# Patient Record
Sex: FEMALE | Race: WHITE
[De-identification: names, ages, dates, MRNs, and addresses within clinical notes are randomized per-mention and may not be internally consistent; named-entity substitution may affect disease eponyms.]

---

## 2017-07-28 ENCOUNTER — HOSPITAL ENCOUNTER (OUTPATIENT)
Dept: HOSPITAL 47 - EC | Age: 52
Setting detail: OBSERVATION
Discharge: LEFT BEFORE DISCHARGE | End: 2017-07-28
Payer: COMMERCIAL

## 2017-07-28 VITALS — SYSTOLIC BLOOD PRESSURE: 129 MMHG | HEART RATE: 91 BPM | TEMPERATURE: 99.2 F | DIASTOLIC BLOOD PRESSURE: 74 MMHG

## 2017-07-28 VITALS — RESPIRATION RATE: 16 BRPM

## 2017-07-28 VITALS — BODY MASS INDEX: 31.8 KG/M2

## 2017-07-28 DIAGNOSIS — N83.201: ICD-10-CM

## 2017-07-28 DIAGNOSIS — F90.9: ICD-10-CM

## 2017-07-28 DIAGNOSIS — Z79.899: ICD-10-CM

## 2017-07-28 DIAGNOSIS — K56.7: Primary | ICD-10-CM

## 2017-07-28 DIAGNOSIS — N83.202: ICD-10-CM

## 2017-07-28 DIAGNOSIS — F17.200: ICD-10-CM

## 2017-07-28 DIAGNOSIS — Z87.442: ICD-10-CM

## 2017-07-28 DIAGNOSIS — Z88.5: ICD-10-CM

## 2017-07-28 DIAGNOSIS — K21.9: ICD-10-CM

## 2017-07-28 LAB
ALP SERPL-CCNC: 51 U/L (ref 38–126)
ALT SERPL-CCNC: 29 U/L (ref 9–52)
AMYLASE SERPL-CCNC: 40 U/L (ref 30–110)
ANION GAP SERPL CALC-SCNC: 9 MMOL/L
AST SERPL-CCNC: 15 U/L (ref 14–36)
BASOPHILS # BLD AUTO: 0.1 K/UL (ref 0–0.2)
BASOPHILS NFR BLD AUTO: 1 %
BUN SERPL-SCNC: 14 MG/DL (ref 7–17)
CALCIUM SPEC-MCNC: 9.5 MG/DL (ref 8.4–10.2)
CH: 31.9
CHCM: 34.3
CHLORIDE SERPL-SCNC: 103 MMOL/L (ref 98–107)
CO2 SERPL-SCNC: 26 MMOL/L (ref 22–30)
EOSINOPHIL # BLD AUTO: 0.1 K/UL (ref 0–0.7)
EOSINOPHIL NFR BLD AUTO: 1 %
ERYTHROCYTE [DISTWIDTH] IN BLOOD BY AUTOMATED COUNT: 4.78 M/UL (ref 3.8–5.4)
ERYTHROCYTE [DISTWIDTH] IN BLOOD: 13.8 % (ref 11.5–15.5)
GLUCOSE SERPL-MCNC: 89 MG/DL (ref 74–99)
HCT VFR BLD AUTO: 44.6 % (ref 34–46)
HDW: 2.16
HGB BLD-MCNC: 15 GM/DL (ref 11.4–16)
LUC NFR BLD AUTO: 2 %
LYMPHOCYTES # SPEC AUTO: 1.4 K/UL (ref 1–4.8)
LYMPHOCYTES NFR SPEC AUTO: 13 %
MCH RBC QN AUTO: 31.5 PG (ref 25–35)
MCHC RBC AUTO-ENTMCNC: 33.7 G/DL (ref 31–37)
MCV RBC AUTO: 93.5 FL (ref 80–100)
MONOCYTES # BLD AUTO: 0.7 K/UL (ref 0–1)
MONOCYTES NFR BLD AUTO: 7 %
NEUTROPHILS # BLD AUTO: 8.5 K/UL (ref 1.3–7.7)
NEUTROPHILS NFR BLD AUTO: 77 %
NON-AFRICAN AMERICAN GFR(MDRD): >60
PH UR: 8 [PH] (ref 5–8)
POTASSIUM SERPL-SCNC: 3.9 MMOL/L (ref 3.5–5.1)
PROT SERPL-MCNC: 6.6 G/DL (ref 6.3–8.2)
SODIUM SERPL-SCNC: 138 MMOL/L (ref 137–145)
SP GR UR: 1.01 (ref 1–1.03)
UA BILLING (MACRO VS. MICRO): (no result)
UROBILINOGEN UR QL STRIP: <2 MG/DL (ref ?–2)
WBC # BLD AUTO: 0.25 10*3/UL
WBC # BLD AUTO: 11 K/UL (ref 3.8–10.6)
WBC (PEROX): 10.46

## 2017-07-28 PROCEDURE — 96375 TX/PRO/DX INJ NEW DRUG ADDON: CPT

## 2017-07-28 PROCEDURE — 96361 HYDRATE IV INFUSION ADD-ON: CPT

## 2017-07-28 PROCEDURE — 36415 COLL VENOUS BLD VENIPUNCTURE: CPT

## 2017-07-28 PROCEDURE — 96374 THER/PROPH/DIAG INJ IV PUSH: CPT

## 2017-07-28 PROCEDURE — 80053 COMPREHEN METABOLIC PANEL: CPT

## 2017-07-28 PROCEDURE — 83690 ASSAY OF LIPASE: CPT

## 2017-07-28 PROCEDURE — 96372 THER/PROPH/DIAG INJ SC/IM: CPT

## 2017-07-28 PROCEDURE — 99285 EMERGENCY DEPT VISIT HI MDM: CPT

## 2017-07-28 PROCEDURE — 81003 URINALYSIS AUTO W/O SCOPE: CPT

## 2017-07-28 PROCEDURE — 74177 CT ABD & PELVIS W/CONTRAST: CPT

## 2017-07-28 PROCEDURE — 85025 COMPLETE CBC W/AUTO DIFF WBC: CPT

## 2017-07-28 PROCEDURE — 96376 TX/PRO/DX INJ SAME DRUG ADON: CPT

## 2017-07-28 PROCEDURE — 82150 ASSAY OF AMYLASE: CPT

## 2017-07-28 RX ADMIN — MORPHINE SULFATE PRN MG: 4 INJECTION, SOLUTION INTRAMUSCULAR; INTRAVENOUS at 15:49

## 2017-07-28 RX ADMIN — MORPHINE SULFATE PRN MG: 4 INJECTION, SOLUTION INTRAMUSCULAR; INTRAVENOUS at 11:57

## 2017-07-28 NOTE — P.GSCN
History of Present Illness


Consult date: 07/28/17


Reason for Consult: 





Abdominal pain


History of present illness: 





The patient presents with a couple of day history of abdominal discomfort.  The 

pain is generalized.  It got much worse last night and began to radiate to the 

pelvis so she came in.  She was concerned she could have her recurrent kidney 

stone.  The computed tomography scan did not show that.  She has not had any 

problems with diarrhea or constipation.  She tends to have loose stools 1-2 

times a day but that's been present for many years.  Bowel movement was 2 days 

ago.  No blood in the stools or dark tarry stools.  She's had some nausea.  No 

fevers or chills.  No one else is been ill.





Review of Systems


All systems: negative





Past Medical History


Past Medical History: GERD/Reflux, Renal Disease


Additional Past Medical History / Comment(s): kidney stones, sinus infections, 

arthritis


History of Any Multi-Drug Resistant Organisms: None Reported


Past Surgical History: Breast Surgery, Tubal Ligation


Additional Past Surgical History / Comment(s): TL using clamps-at age 20 yrs 

over in [place], left breast lumpectomy (biopsy negative), exploratory lap of 

abdomen.


Past Anesthesia/Blood Transfusion Reactions: No Reported Reaction


Additional Past Anesthesia/Blood Transfusion Reaction / Comm: Pt states she has 

never recieve blood.


Past Psychological History: ADD/ADHD, Anxiety


Smoking Status: Current every day smoker


Past Alcohol Use History: Rare


Additional Past Alcohol Use History / Comment(s): Pt states she started smoking 

at age 16 yrs and is 1/2 ppd smoker.


Past Drug Use History: None Reported





- Past Family History


  ** Father


Family Medical History: Diabetes Mellitus


Additional Family Medical History / Comment(s): Father is 75 yrs old.





  ** Mother


History Unknown: Yes


Family Medical History: Unable to Obtain


Additional Family Medical History / Comment(s): Pt states mother is on alot of 

medications but she does not know what health issues she has.  Her mother is 75 

yrs. old.





Medications and Allergies


 Home Medications











 Medication  Instructions  Recorded  Confirmed  Type


 


Dextroamphetamine/Amphetamine 20 mg PO BID 10/30/15 07/28/17 History





[Adderall]    


 


LORazepam [Ativan] 0.5 mg PO BID PRN 10/30/15 07/28/17 History


 


Aspirin-Acet-Caff [id number] 2 tab PO DAILY PRN 07/28/17 07/28/17 History





[Excedrin]    


 


Fluticasone Nasal Spray [Flonase 2 spr EA NOSTRIL HS 07/28/17 07/28/17 History





Nasal Spray]    


 


Omeprazole [PriLOSEC] 20 mg PO DAILY 07/28/17 07/28/17 History











 Allergies











Allergy/AdvReac Type Severity Reaction Status Date / Time


 


hydrocodone bitartrate AdvReac  Nausea & Verified 07/28/17 08:13





[From Lortab]   Vomiting  














Surgical - Exam


Osteopathic Statement: *.  No significant issues noted on an osteopathic 

structural exam other than those noted in the History and Physical/Consult.


 Vital Signs











Temp Pulse Resp BP Pulse Ox


 


 97.8 F   101 H  18   134/85   96 


 


 07/28/17 05:14  07/28/17 05:14  07/28/17 05:14  07/28/17 05:14  07/28/17 05:14














- General


well developed, well nourished, no distress





- Eyes


normal ocular movement





- ENT


normal mucosa, no hearing loss





- Neck


trachea midline





- Respiratory


normal respiratory effort, clear to auscultation





- Cardiovascular


Rhythm: regular


Abnormal Heart Sounds: no systolic murmur





- Abdomen





No tympany to percussion


Abdomen: soft, tender (Mild diffuse), bowel sounds, no guarding, no rigid, no 

rebound, no distended





- Psychiatric


oriented to time, oriented to person, oriented to place, speech is normal, 

memory intact





Results





- Labs





 07/28/17 05:44





 07/28/17 05:44


 Abnormal Lab Results - Last 24 Hours (Table)











  07/28/17 Range/Units





  05:44 


 


WBC  11.0 H  (3.8-10.6)  k/uL


 


Neutrophils #  8.5 H  (1.3-7.7)  k/uL








 Diabetes panel











  07/28/17 Range/Units





  05:44 


 


Sodium  138  (137-145)  mmol/L


 


Potassium  3.9  (3.5-5.1)  mmol/L


 


Chloride  103  ()  mmol/L


 


Carbon Dioxide  26  (22-30)  mmol/L


 


BUN  14  (7-17)  mg/dL


 


Creatinine  0.70  (0.52-1.04)  mg/dL


 


Glucose  89  (74-99)  mg/dL


 


Calcium  9.5  (8.4-10.2)  mg/dL


 


AST  15  (14-36)  U/L


 


ALT  29  (9-52)  U/L


 


Alkaline Phosphatase  51  ()  U/L


 


Total Protein  6.6  (6.3-8.2)  g/dL


 


Albumin  4.2  (3.5-5.0)  g/dL








 Calcium panel











  07/28/17 Range/Units





  05:44 


 


Calcium  9.5  (8.4-10.2)  mg/dL


 


Albumin  4.2  (3.5-5.0)  g/dL








 Pituitary panel











  07/28/17 Range/Units





  05:44 


 


Sodium  138  (137-145)  mmol/L


 


Potassium  3.9  (3.5-5.1)  mmol/L


 


Chloride  103  ()  mmol/L


 


Carbon Dioxide  26  (22-30)  mmol/L


 


BUN  14  (7-17)  mg/dL


 


Creatinine  0.70  (0.52-1.04)  mg/dL


 


Glucose  89  (74-99)  mg/dL


 


Calcium  9.5  (8.4-10.2)  mg/dL








 Adrenal panel











  07/28/17 Range/Units





  05:44 


 


Sodium  138  (137-145)  mmol/L


 


Potassium  3.9  (3.5-5.1)  mmol/L


 


Chloride  103  ()  mmol/L


 


Carbon Dioxide  26  (22-30)  mmol/L


 


BUN  14  (7-17)  mg/dL


 


Creatinine  0.70  (0.52-1.04)  mg/dL


 


Glucose  89  (74-99)  mg/dL


 


Calcium  9.5  (8.4-10.2)  mg/dL


 


Total Bilirubin  0.5  (0.2-1.3)  mg/dL


 


AST  15  (14-36)  U/L


 


ALT  29  (9-52)  U/L


 


Alkaline Phosphatase  51  ()  U/L


 


Total Protein  6.6  (6.3-8.2)  g/dL


 


Albumin  4.2  (3.5-5.0)  g/dL














- Imaging


CT scan - abdomen: report reviewed, image reviewed





Assessment and Plan


(1) History of ovarian cyst


Status: Acute   





(2) History of nephrolithiasis


Status: Acute   





(3) Abdominal pain


Status: Acute   





(4) Ileus


Status: Acute   


Plan: 





Currently this appears to be a mild ileus.  She's tolerating a clear liquid 

diet.  I would treat her supportively.  Serial exams.  Currently nonsurgical.

## 2017-07-28 NOTE — ED
General Adult HPI





- General


Source: patient, RN notes reviewed


Mode of arrival: ambulatory


Limitations: no limitations





<Mateo Palomino - Last Filed: 07/28/17 05:27>





<Trenton Mcarthur - Last Filed: 07/28/17 08:09>





- General


Chief complaint: Abdominal Pain


Stated complaint: Abd pain


Time Seen by Provider: 07/28/17 05:23





- History of Present Illness


Initial comments: 





Patient is a pleasant 51-year-old female presenting to the emergency department 

complaining of abdominal discomfort.  Onset was 2 days ago.  Symptoms have been 

somewhat waxing and waning.  Discomfort is mostly diffuse however may be more 

so in the suprapubic region.  No back pain.  Patient has nausea without 

vomiting.  Patient did have some urinary frequency last week however that has 

improved.  No dysuria.  No hematuria.  No constipation or diarrhea.  No fevers. 

(Mateo Palomino)





- Related Data


 Home Medications











 Medication  Instructions  Recorded  Confirmed


 


Artificial Tears-Hypromellose 1 drops BOTH EYES BID PRN 10/30/15 02/15/16





[Artificial Tear Drops]   


 


Dextroamphetamine/Amphetamine 20 mg PO BID 10/30/15 02/15/16





[Adderall]   


 


Docusate [Colace] 100 mg PO DAILY PRN 10/30/15 02/15/16


 


LORazepam [Ativan] 0.5 mg PO BID PRN 10/30/15 02/15/16


 


Ranitidine HCl 75 mg PO BID PRN 10/30/15 02/15/16








 Previous Rx's











 Medication  Instructions  Recorded


 


HYDROcodone/APAP 10-325MG [Norco 1 tab PO Q6H PRN #15 tab 02/01/16





]  


 


Ibuprofen [Motrin] 600 mg PO Q8HR PRN #20 tab 02/01/16


 


Ondansetron Odt [Zofran Odt] 4 mg PO Q8HR PRN #10 tab 02/01/16


 


Tamsulosin [Flomax] 0.4 mg PO DAILY #7 cap 02/01/16


 


Erythromycin Ophth Oint [Romycin 1 applic RIGHT EYE QID #3.5 gm 02/15/16





Ophth Oint]  











 Allergies











Allergy/AdvReac Type Severity Reaction Status Date / Time


 


acetaminophen [From Lortab] AdvReac  Unknown Verified 07/28/17 05:16


 


hydrocodone bitartrate AdvReac  Unknown Verified 07/28/17 05:16





[From Lortab]     














Review of Systems


ROS Other: All systems not noted in ROS Statement are negative.


Constitutional: Denies: fever, chills


Eyes: Denies: eye pain


ENT: Denies: ear pain


Respiratory: Denies: cough


Cardiovascular: Denies: chest pain


Endocrine: Denies: fatigue


Gastrointestinal: Reports: abdominal pain, nausea.  Denies: vomiting, diarrhea, 

constipation


Genitourinary: Reports: frequency.  Denies: urgency, dysuria, hematuria


Musculoskeletal: Denies: back pain


Skin: Denies: rash


Neurological: Denies: weakness





<Mateo Palomino - Last Filed: 07/28/17 05:27>


ROS Other: All systems not noted in ROS Statement are negative.





<Trenton Mcarthur - Last Filed: 07/28/17 08:09>


ROS Statement: 


Those systems with pertinent positive or pertinent negative responses have been 

documented in the HPI.








Past Medical History


Past Medical History: GERD/Reflux, Renal Disease


Additional Past Medical History / Comment(s): 10/30/15  Pt presented to [facility] ER 

with suprapubic abdominal pain radiating to her back.  Pt has hx of kidney 

stone once before.  She stated it feels like that but then it did not radiate.  

Current pain began 3 weeks ago.  Obstetric history: She had her first vaginal 

delivery when she was 16, then she had a voluntary termination, then she had 

any other vaginal delivery.


History of Any Multi-Drug Resistant Organisms: None Reported


Past Surgical History: Tubal Ligation


Additional Past Surgical History / Comment(s): novasure uterine ablation, TL 

using clamps-at age 20 yrs over in [place].


Past Anesthesia/Blood Transfusion Reactions: No Reported Reaction


Additional Past Anesthesia/Blood Transfusion Reaction / Comment(s): Pt states 

she has never recieve blood.


Past Psychological History: ADD/ADHD


Smoking Status: Current every day smoker


Past Alcohol Use History: Rare


Past Drug Use History: None Reported





- Past Family History


  ** Father


Family Medical History: Diabetes Mellitus


Additional Family Medical History / Comment(s): Father is 75 yrs old.





  ** Mother


History Unknown: Yes


Additional Family Medical History / Comment(s): Pt states mother is on alot of 

medications but she does not know what health issues she has.  Her mother is 75 

yrs. old.





<Mateo Palomino - Last Filed: 07/28/17 05:27>





General Exam


Limitations: no limitations


General appearance: alert, in no apparent distress


Head exam: Present: atraumatic


Eye exam: Present: normal appearance, PERRL


ENT exam: Present: normal oropharynx


Neck exam: Present: normal inspection


Respiratory exam: Present: normal lung sounds bilaterally


Cardiovascular Exam: Present: regular rate, normal rhythm


  ** Expanded


Peripheral pulses: 2+: Posterior Tibialis (R), Posterior Tibialis (L)


GI/Abdominal exam: Present: soft, tenderness (Mild suprapubic tenderness), 

normal bowel sounds.  Absent: distended, guarding, rebound, rigid, pulsatile 

mass


Extremities exam: Present: normal inspection.  Absent: pedal edema, calf 

tenderness


Back exam: Present: normal inspection.  Absent: tenderness


Neurological exam: Present: alert


Psychiatric exam: Present: normal affect, normal mood


Skin exam: Present: normal color





<Mateo Palomino Last Filed: 07/28/17 05:27>


General appearance: alert, in no apparent distress


Head exam: Present: atraumatic, normocephalic, normal inspection


Eye exam: Present: normal appearance, PERRL, EOMI.  Absent: scleral icterus, 

conjunctival injection, periorbital swelling


ENT exam: Present: normal exam, mucous membranes moist


Neck exam: Present: normal inspection.  Absent: tenderness, meningismus, 

lymphadenopathy


Respiratory exam: Present: normal lung sounds bilaterally.  Absent: respiratory 

distress, wheezes, rales, rhonchi, stridor


Cardiovascular Exam: Present: regular rate, normal rhythm, normal heart sounds.

  Absent: systolic murmur, diastolic murmur, rubs, gallop, clicks


GI/Abdominal exam: Present: soft, normal bowel sounds.  Absent: distended, 

tenderness, guarding, rebound, rigid


Extremities exam: Present: normal inspection, full ROM, normal capillary 

refill.  Absent: tenderness, pedal edema, joint swelling, calf tenderness


Back exam: Present: normal inspection


Neurological exam: Present: alert, oriented X3, CN II-XII intact


Psychiatric exam: Present: normal affect, normal mood


Skin exam: Present: warm, dry, intact, normal color.  Absent: rash





<Trenton Mcarthur - Last Filed: 07/28/17 08:09>





Course





<Mateo Palomino Last Filed: 07/28/17 05:27>





<Trenton Mcarthur - Last Filed: 07/28/17 08:09>





 Vital Signs











  07/28/17 07/28/17





  05:14 07:17


 


Temperature 97.8 F 97.1 F L


 


Pulse Rate 101 H 90


 


Respiratory 18 17





Rate  


 


Blood Pressure 134/85 157/74


 


O2 Sat by Pulse 96 100





Oximetry  














- Reevaluation(s)


Reevaluation #1: 





07/28/17 08:09


Patient does feel better but feels symptoms will come back as soon as she leaves

 (Trenton Mcarthur)





Medical Decision Making





<Mateo Palomino - Last Filed: 07/28/17 05:27>





- Lab Data


Result diagrams: 


 07/28/17 05:44





 07/28/17 05:44





- Radiology Data


Radiology results: report reviewed (Computed tomography scan and pelvis 

positive for ileus), image reviewed





<Trenton Mcarthur - Last Filed: 07/28/17 08:09>





- Medical Decision Making





51 female any abdominal pain, positive ileus on CAT scan, patient I will 

tolerate oral fluids, no bowel movement since yesterday.  Patient be admitted 

for conservative therapy, nothing by mouth and symptom control (Trenton Mcarthur)





- Lab Data





 Lab Results











  07/28/17 07/28/17 07/28/17 Range/Units





  05:44 05:44 05:44 


 


WBC   11.0 H   (3.8-10.6)  k/uL


 


RBC   4.78   (3.80-5.40)  m/uL


 


Hgb   15.0   (11.4-16.0)  gm/dL


 


Hct   44.6   (34.0-46.0)  %


 


MCV   93.5   (80.0-100.0)  fL


 


MCH   31.5   (25.0-35.0)  pg


 


MCHC   33.7   (31.0-37.0)  g/dL


 


RDW   13.8   (11.5-15.5)  %


 


Plt Count   402   (150-450)  k/uL


 


Neutrophils %   77   %


 


Lymphocytes %   13   %


 


Monocytes %   7   %


 


Eosinophils %   1   %


 


Basophils %   1   %


 


Neutrophils #   8.5 H   (1.3-7.7)  k/uL


 


Lymphocytes #   1.4   (1.0-4.8)  k/uL


 


Monocytes #   0.7   (0-1.0)  k/uL


 


Eosinophils #   0.1   (0-0.7)  k/uL


 


Basophils #   0.1   (0-0.2)  k/uL


 


Sodium  138    (137-145)  mmol/L


 


Potassium  3.9    (3.5-5.1)  mmol/L


 


Chloride  103    ()  mmol/L


 


Carbon Dioxide  26    (22-30)  mmol/L


 


Anion Gap  9    mmol/L


 


BUN  14    (7-17)  mg/dL


 


Creatinine  0.70    (0.52-1.04)  mg/dL


 


Est GFR (MDRD) Af Amer  >60    (>60 ml/min/1.73 sqM)  


 


Est GFR (MDRD) Non-Af  >60    (>60 ml/min/1.73 sqM)  


 


Glucose  89    (74-99)  mg/dL


 


Calcium  9.5    (8.4-10.2)  mg/dL


 


Total Bilirubin  0.5    (0.2-1.3)  mg/dL


 


AST  15    (14-36)  U/L


 


ALT  29    (9-52)  U/L


 


Alkaline Phosphatase  51    ()  U/L


 


Total Protein  6.6    (6.3-8.2)  g/dL


 


Albumin  4.2    (3.5-5.0)  g/dL


 


Amylase  40    ()  U/L


 


Lipase  64    ()  U/L


 


Urine Color    Yellow  


 


Urine Appearance    Clear  (Clear)  


 


Urine pH    8.0  (5.0-8.0)  


 


Ur Specific Gravity    1.010  (1.001-1.035)  


 


Urine Protein    Negative  (Negative)  


 


Urine Glucose (UA)    Negative  (Negative)  


 


Urine Ketones    Negative  (Negative)  


 


Urine Blood    Negative  (Negative)  


 


Urine Nitrite    Negative  (Negative)  


 


Urine Bilirubin    Negative  (Negative)  


 


Urine Urobilinogen    <2.0  (<2.0)  mg/dL


 


Ur Leukocyte Esterase    Negative  (Negative)  














Disposition





<Mateo Palomino - Last Filed: 07/28/17 05:27>





<Trenton Mcarthur - Last Filed: 07/28/17 08:09>


Clinical Impression: 


 Abdominal pain, Ileus





Disposition: ADMITTED AS IP TO THIS [facility]


Condition: Fair


Referrals: 


Home Henderson MD [Primary Care Provider] - 1-2 days

## 2017-07-28 NOTE — CT
EXAMINATION TYPE: CT abdomen pelvis w con

 

DATE OF EXAM: 7/28/2017

 

REFERENCE: Previous study dated 2/1/2016.

 

HISTORY: abdominal pain

HISTORY: pelvic pain

 

REFERENCE: NONE

 

CT DLP: 743.7 mGy

Automated exposure control for dose reduction was used.

 

TECHNIQUE: Helical acquisition through the abdomen and pelvis was obtained following the oral ingesti
on of without Oral Contrast and following intravenous administration of 100 mL of Omnipaque 300. The 
data was reformatted in axial, coronal and sagittal projections.

 

FINDINGS:  Visualized portions of the lungs are clear. There is no pleural or pericardial fluid. The 
heart is not enlarged.

 

Within the abdomen, there is a 4.6 mm low attenuating lesion in the medial segment of the left lobe o
f the liver, too small accurately characterize. The liver, spleen and gallbladder are otherwise kimo
l.

 

Both adrenal glands are normal. Both kidneys demonstrate function and are morphologically normal.

 

Pancreas is unremarkable.

 

There is no significant retroperitoneal, iliac or inguinal adenopathy.

 

The bladder is unremarkable.

 

Uterus is unremarkable. The endometrial stripe measures 1.3 cm. There has been a previous tubal ligat
ion. There is a 1.5 cm right ovarian cyst and a 1.7 cm left ovarian cyst.

 

There are scattered diverticula within the sigmoid colon and elsewhere throughout the left side of th
e colon. There is no radiographic evidence of diverticulitis.

 

There are mildly dilated fluid-filled loops of small bowel in the lower abdomen. The more proximal sm
all bowel appears normal.

 

There is no free fluid and no free air.

 

No bony destructive lesion is seen.

 

IMPRESSION: 

1. MILDLY DILATED AND FLUID-FILLED LOOPS OF DISTAL SMALL BOWEL. LOCALIZED ILEUS VERSUS EARLY PARTIAL 
OBSTRUCTION WOULD NEED TO BE CONSIDERED.

2. 4.6 MM LESION IN THE LEFT LOBE OF LIVER, TOO SMALL TO CHARACTERIZE ACCURATELY.

3. BILATERAL OVARIAN CYSTS.

4. MINIMAL, NONCOMPLICATED DIVERTICULOSIS OF THE LEFT SIDE OF THE COLON.

## 2020-02-13 ENCOUNTER — HOSPITAL ENCOUNTER (OUTPATIENT)
Dept: HOSPITAL 47 - RADMAMWWP | Age: 55
Discharge: HOME | End: 2020-02-13
Payer: COMMERCIAL

## 2020-02-13 DIAGNOSIS — N64.4: Primary | ICD-10-CM

## 2020-02-13 PROCEDURE — 77066 DX MAMMO INCL CAD BI: CPT

## 2020-02-13 NOTE — MM
Reason for exam: clinical finding.

Last mammogram was performed 4 years and 7 months ago.



History:

Benign MG stereo VAD BX LT of the left breast, August 4, 2015.  Benign core biopsy

of the left breast, 2010.



Physical Findings:

Nurse did not find any significant physical abnormalities on exam.



MG Diagnostic Mammo w CAD [facility]

Bilateral CC and MLO view(s) were taken.

Prior study comparison: July 24, 2015, left breast MG diagnostic mammo LT w CAD.  

May 6, 2015, bilateral MG screening mammo w CAD.

The breast tissue is heterogeneously dense. This may lower the sensitivity of 

mammography.  Stable left retroareolar asymmetry. Left biopsy marker in a stable 

mass.



These results were verbally communicated with the patient and result sheet given 

to the patient on 2/13/20.





ASSESSMENT: Benign, BI-RAD 2



RECOMMENDATION:

Routine screening mammogram of both breasts in 1 year.

## 2021-12-10 ENCOUNTER — HOSPITAL ENCOUNTER (OUTPATIENT)
Dept: HOSPITAL 47 - ORWHC2ENDO | Age: 56
Discharge: HOME | End: 2021-12-10
Attending: SURGERY
Payer: COMMERCIAL

## 2021-12-10 VITALS — HEART RATE: 90 BPM | DIASTOLIC BLOOD PRESSURE: 83 MMHG | SYSTOLIC BLOOD PRESSURE: 137 MMHG

## 2021-12-10 VITALS — RESPIRATION RATE: 16 BRPM | TEMPERATURE: 97 F

## 2021-12-10 VITALS — BODY MASS INDEX: 31.6 KG/M2

## 2021-12-10 DIAGNOSIS — Z12.11: Primary | ICD-10-CM

## 2021-12-10 DIAGNOSIS — M19.90: ICD-10-CM

## 2021-12-10 DIAGNOSIS — Z79.84: ICD-10-CM

## 2021-12-10 DIAGNOSIS — Z79.899: ICD-10-CM

## 2021-12-10 DIAGNOSIS — Z83.3: ICD-10-CM

## 2021-12-10 DIAGNOSIS — K64.8: ICD-10-CM

## 2021-12-10 DIAGNOSIS — K21.9: ICD-10-CM

## 2021-12-10 DIAGNOSIS — F90.9: ICD-10-CM

## 2021-12-10 DIAGNOSIS — F41.9: ICD-10-CM

## 2021-12-10 DIAGNOSIS — F17.210: ICD-10-CM

## 2021-12-10 DIAGNOSIS — Z88.5: ICD-10-CM

## 2021-12-10 DIAGNOSIS — Z98.51: ICD-10-CM

## 2021-12-10 DIAGNOSIS — E11.9: ICD-10-CM

## 2021-12-10 DIAGNOSIS — Z79.1: ICD-10-CM

## 2021-12-10 DIAGNOSIS — K57.30: ICD-10-CM

## 2021-12-10 DIAGNOSIS — I10: ICD-10-CM

## 2021-12-10 LAB — GLUCOSE BLD-MCNC: 97 MG/DL (ref 75–99)

## 2021-12-10 PROCEDURE — 45378 DIAGNOSTIC COLONOSCOPY: CPT

## 2021-12-10 NOTE — P.PCN
Date of Procedure: 12/10/21


Preoperative Diagnosis: 


Screening for colon cancer


Postoperative Diagnosis: 


Diverticulosis


Procedure(s) Performed: 


Colonoscopy


Anesthesia: MAC


Surgeon: Michael Cardenas


Pathology: none sent


Condition: stable


Disposition: same day


Indications for Procedure: 


56-year-old female presents for screening colonoscopy.  Risks, benefits and 

alternatives were provided to the patient.  She has never had a colonoscopy 

previously.


Operative Findings: 


Diverticulosis


Description of Procedure: 


The patient was brought into the endoscopy suite and placed in left lateral 

decubitus position.  Adequate sedation was achieved using conscious sedation.  A

digital rectal exam was performed and internal hemorrhoids were palpated.  An 

endoscope was then placed in the rectum and advanced to the cecum as identified 

by landmarks including the appendiceal orifice and the ileocecal valve.  The 

prep was good.  The colonoscope was slowly withdrawn, examining for any mucosal 

antibiotics.  The cecum, ascending, transverse, descending and sigmoid colon 

were visualized adequately.  There were no large neoplastic lesions noted 

throughout the colon.  There were no obvious polyps noted throughout the colon. 

Moderate amount of diverticulosis was noted in the descending and sigmoid colon.

 Retroflexion was performed in the rectum and internal hemorrhoids were visible.

 Excess air was removed from the colonoscope withdrawn and the procedure 

terminated.  The patient was then transferred to the recovery unit in stable 

condition.  Repeat colonoscopy should be performed in 8 years.

## 2022-09-29 ENCOUNTER — HOSPITAL ENCOUNTER (OUTPATIENT)
Dept: HOSPITAL 47 - LABWHC1 | Age: 57
Discharge: HOME | End: 2022-09-29
Attending: FAMILY MEDICINE
Payer: COMMERCIAL

## 2022-09-29 DIAGNOSIS — I10: Primary | ICD-10-CM

## 2022-09-29 DIAGNOSIS — E55.9: ICD-10-CM

## 2022-09-29 LAB
ALBUMIN SERPL-MCNC: 4.3 G/DL (ref 3.8–4.9)
ALBUMIN/GLOB SERPL: 1.81 G/DL (ref 1.6–3.17)
ALP SERPL-CCNC: 60 U/L (ref 41–126)
ALT SERPL-CCNC: 25 U/L (ref 8–44)
ANION GAP SERPL CALC-SCNC: 10.8 MMOL/L (ref 10–18)
AST SERPL-CCNC: 13 U/L (ref 13–35)
BUN SERPL-SCNC: 19 MG/DL (ref 9–27)
BUN/CREAT SERPL: 23.72 RATIO (ref 12–20)
CALCIUM SPEC-MCNC: 9.8 MG/DL (ref 8.7–10.3)
CHLORIDE SERPL-SCNC: 102 MMOL/L (ref 96–109)
CO2 SERPL-SCNC: 27.1 MMOL/L (ref 20–27.5)
ERYTHROCYTE [DISTWIDTH] IN BLOOD BY AUTOMATED COUNT: 4.63 X 10*6/UL (ref 4.1–5.2)
ERYTHROCYTE [DISTWIDTH] IN BLOOD: 12.5 % (ref 11.5–14.5)
GLOBULIN SER CALC-MCNC: 2.4 G/DL (ref 1.6–3.3)
GLUCOSE SERPL-MCNC: 110 MG/DL (ref 70–110)
HCT VFR BLD AUTO: 42.5 % (ref 37.2–46.3)
HGB BLD-MCNC: 13.8 G/DL (ref 12–15)
MCH RBC QN AUTO: 29.8 PG (ref 27–32)
MCHC RBC AUTO-ENTMCNC: 32.5 G/DL (ref 32–37)
MCV RBC AUTO: 91.8 FL (ref 80–97)
NRBC BLD AUTO-RTO: 0 /100 WBCS (ref 0–0)
PLATELET # BLD AUTO: 437 X 10*3/UL (ref 140–440)
POTASSIUM SERPL-SCNC: 5.3 MMOL/L (ref 3.5–5.5)
PROT SERPL-MCNC: 6.7 G/DL (ref 6.2–8.2)
SODIUM SERPL-SCNC: 140 MMOL/L (ref 135–145)
WBC # BLD AUTO: 18.11 X 10*3/UL (ref 4.5–10)

## 2022-09-29 PROCEDURE — 85025 COMPLETE CBC W/AUTO DIFF WBC: CPT

## 2022-09-29 PROCEDURE — 80053 COMPREHEN METABOLIC PANEL: CPT

## 2022-09-29 PROCEDURE — 36415 COLL VENOUS BLD VENIPUNCTURE: CPT

## 2022-09-29 PROCEDURE — 82306 VITAMIN D 25 HYDROXY: CPT

## 2022-09-29 PROCEDURE — 84630 ASSAY OF ZINC: CPT

## 2022-09-29 PROCEDURE — 82180 ASSAY OF ASCORBIC ACID: CPT

## 2022-09-30 LAB
BASOPHILS # BLD AUTO: 0.02 X 10*3/UL (ref 0–0.1)
BASOPHILS NFR BLD AUTO: 0.1 %
EOSINOPHIL # BLD AUTO: 0 X 10*3/UL (ref 0.04–0.35)
EOSINOPHIL NFR BLD AUTO: 0 %
IMM GRANULOCYTES BLD QL AUTO: 1 %
LYMPHOCYTES # SPEC AUTO: 2.15 X 10*3/UL (ref 0.9–5)
LYMPHOCYTES NFR SPEC AUTO: 11.9 %
MONOCYTES # BLD AUTO: 1.59 X 10*3/UL (ref 0.2–1)
MONOCYTES NFR BLD AUTO: 8.8 %
NEUTROPHILS # BLD AUTO: 14.16 X 10*3/UL (ref 1.8–7.7)
NEUTROPHILS NFR BLD AUTO: 78.2 %
ZINC SERPL-MCNC: 71 UG/DL (ref 60–130)

## 2024-08-05 ENCOUNTER — HOSPITAL ENCOUNTER (EMERGENCY)
Dept: HOSPITAL 47 - EC | Age: 59
Discharge: HOME | End: 2024-08-05
Payer: COMMERCIAL

## 2024-08-05 VITALS — RESPIRATION RATE: 18 BRPM | TEMPERATURE: 98.5 F

## 2024-08-05 VITALS — DIASTOLIC BLOOD PRESSURE: 72 MMHG | HEART RATE: 78 BPM | SYSTOLIC BLOOD PRESSURE: 158 MMHG

## 2024-08-05 LAB
ALBUMIN SERPL-MCNC: 4.1 G/DL (ref 3.5–5)
ALP SERPL-CCNC: 69 U/L (ref 38–126)
ALT SERPL-CCNC: 21 U/L (ref 4–34)
ANION GAP SERPL CALC-SCNC: 5 MMOL/L
AST SERPL-CCNC: 23 U/L (ref 14–36)
BASOPHILS # BLD AUTO: 0.1 K/UL (ref 0–0.2)
BASOPHILS NFR BLD AUTO: 1 %
BUN SERPL-SCNC: 22 MG/DL (ref 7–17)
CALCIUM SPEC-MCNC: 9.4 MG/DL (ref 8.4–10.2)
CHLORIDE SERPL-SCNC: 109 MMOL/L (ref 98–107)
CO2 SERPL-SCNC: 23 MMOL/L (ref 22–30)
EOSINOPHIL # BLD AUTO: 0.2 K/UL (ref 0–0.7)
EOSINOPHIL NFR BLD AUTO: 3 %
ERYTHROCYTE [DISTWIDTH] IN BLOOD BY AUTOMATED COUNT: 4.85 M/UL (ref 3.8–5.4)
ERYTHROCYTE [DISTWIDTH] IN BLOOD: 13 % (ref 11.5–15.5)
GLUCOSE SERPL-MCNC: 117 MG/DL (ref 74–99)
HCT VFR BLD AUTO: 43.6 % (ref 34–46)
HGB BLD-MCNC: 14.4 GM/DL (ref 11.4–16)
LYMPHOCYTES # SPEC AUTO: 1.3 K/UL (ref 1–4.8)
LYMPHOCYTES NFR SPEC AUTO: 17 %
MCH RBC QN AUTO: 29.8 PG (ref 25–35)
MCHC RBC AUTO-ENTMCNC: 33.1 G/DL (ref 31–37)
MCV RBC AUTO: 90 FL (ref 80–100)
MONOCYTES # BLD AUTO: 0.5 K/UL (ref 0–1)
MONOCYTES NFR BLD AUTO: 7 %
NEUTROPHILS # BLD AUTO: 5.5 K/UL (ref 1.3–7.7)
NEUTROPHILS NFR BLD AUTO: 71 %
PLATELET # BLD AUTO: 325 K/UL (ref 150–450)
POTASSIUM SERPL-SCNC: 4.1 MMOL/L (ref 3.5–5.1)
PROT SERPL-MCNC: 6.5 G/DL (ref 6.3–8.2)
SODIUM SERPL-SCNC: 137 MMOL/L (ref 137–145)
WBC # BLD AUTO: 7.7 K/UL (ref 3.8–10.6)

## 2024-08-05 PROCEDURE — 96361 HYDRATE IV INFUSION ADD-ON: CPT

## 2024-08-05 PROCEDURE — 36415 COLL VENOUS BLD VENIPUNCTURE: CPT

## 2024-08-05 PROCEDURE — 70487 CT MAXILLOFACIAL W/DYE: CPT

## 2024-08-05 PROCEDURE — 96374 THER/PROPH/DIAG INJ IV PUSH: CPT

## 2024-08-05 PROCEDURE — 99284 EMERGENCY DEPT VISIT MOD MDM: CPT

## 2024-08-05 PROCEDURE — 85025 COMPLETE CBC W/AUTO DIFF WBC: CPT

## 2024-08-05 PROCEDURE — 96375 TX/PRO/DX INJ NEW DRUG ADDON: CPT

## 2024-08-05 PROCEDURE — 80053 COMPREHEN METABOLIC PANEL: CPT

## 2024-08-05 PROCEDURE — 70450 CT HEAD/BRAIN W/O DYE: CPT

## 2024-08-05 RX ADMIN — METOCLOPRAMIDE STA MG: 5 INJECTION, SOLUTION INTRAMUSCULAR; INTRAVENOUS at 04:21

## 2024-08-05 RX ADMIN — CEFAZOLIN STA MLS/HR: 330 INJECTION, POWDER, FOR SOLUTION INTRAMUSCULAR; INTRAVENOUS at 03:36

## 2024-08-05 RX ADMIN — DIPHENHYDRAMINE HYDROCHLORIDE STA MG: 50 INJECTION, SOLUTION INTRAMUSCULAR; INTRAVENOUS at 04:24

## 2024-08-05 NOTE — ED
General Adult HPI





- General


Chief complaint: Headache


Stated complaint: Headache


Time Seen by Provider: 08/05/24 02:46


Source: patient


Mode of arrival: ambulatory





- History of Present Illness


Initial comments: 


Cheryl 58-year-old female presents the ER today for evaluation of headache.  

Patient reports she has battled sinus headaches and recurrent sinus infections 

for about 20 years.  She states that for the past month she has had a pressure-

like headache worse on the right than the left associated with some sinus 

pressure.  Patient has been on antibiotics for the past 10 days with no 

improvement.  Patient states that today her headache is worse never she has 

developed photophobia and states the headache has become intolerable so she came

to the ER for evaluation.  Patient states that despite these recurrent 

infections and frequent use of antibiotics she has never had any imaging of her 

sinuses.  Patient denies any head trauma or injury.








- Related Data


                                Home Medications











 Medication  Instructions  Recorded  Confirmed


 


Dextroamphetamine/Amphetamine 40 mg PO BID 10/30/15 12/10/21





[Adderall]   


 


LORazepam [Ativan] 0.5 mg PO DAILY PRN 10/30/15 12/10/21


 


Aspirin-Acet-Caff 520-151-06Po 2 tab PO DAILY PRN 07/28/17 12/10/21





[Excedrin]   


 


Fluticasone Nasal Spray [Flonase 2 spr EA NOSTRIL AS DIRECTED PRN 07/28/17 

12/10/21





Nasal Spray]   


 


Omeprazole [PriLOSEC] 20 mg PO DAILY 07/28/17 12/10/21


 


Acetaminophen [Tylenol Extra 1,000 mg PO AS DIRECTED PRN 12/09/21 12/10/21





Strength]   


 


Ibuprofen [Motrin] 800 mg PO BID PRN 12/09/21 12/10/21


 


Losartan [Cozaar] 50 mg PO DAILY 12/09/21 12/10/21


 


metFORMIN HCL [Glucophage] 500 mg PO DAILY 12/09/21 12/10/21








                                  Previous Rx's











 Medication  Instructions  Recorded


 


Azithromycin [Zithromax Z Pack] 1 tab PO AS DIRECTED #6 tab 08/05/24


 


Cetirizine HCl [Zyrtec] 10 mg PO DAILY 30 Days #30 tab 08/05/24











                                    Allergies











Allergy/AdvReac Type Severity Reaction Status Date / Time


 


hydrocodone bitartrate AdvReac  Nausea & Verified 12/10/21 07:17





[From Lortab]   Vomiting  














Review of Systems


ROS Statement: 


Those systems with pertinent positive or pertinent negative responses have been 

documented in the HPI.





ROS Other: All systems not noted in ROS Statement are negative.





Past Medical History


Past Medical History: GERD/Reflux, Hypertension


Additional Past Medical History / Comment(s): hx of kidney stones, sinus 

headaches.


History of Any Multi-Drug Resistant Organisms: None Reported


Past Surgical History: Tubal Ligation, Uterine Ablation


Additional Past Surgical History / Comment(s): novasure uterine ablation, TL 

using clamps-at age 20 yrs over in [place].  Laparoscopy


Past Anesthesia/Blood Transfusion Reactions: No Reported Reaction


Additional Past Anesthesia/Blood Transfusion Reaction / Comment(s): Pt states 

she has never recieve blood.


Past Psychological History: ADD/ADHD


Smoking Status: Current every day smoker


Past Alcohol Use History: None Reported


Past Drug Use History: Marijuana





- Past Family History


  ** Father


Family Medical History: Diabetes Mellitus


Additional Family Medical History / Comment(s): Father is 75 yrs old.





  ** Mother


History Unknown: Yes


Family Medical History: Unable to Obtain


Additional Family Medical History / Comment(s): Pt states mother is on alot of 

medications but she does not know what health issues she has.  Her mother is 75 

yrs. old.





Course


                                   Vital Signs











  08/05/24 08/05/24





  01:50 05:55


 


Temperature 98.5 F 


 


Pulse Rate 85 78


 


Respiratory 18 18





Rate  


 


Blood Pressure 191/93 158/72


 


O2 Sat by Pulse 98 100





Oximetry  














Medical Decision Making





- Medical Decision Making


Was pt. sent in by a medical professional or institution ([facility], NP, urgent 

care, hospital, or nursing home...) When possible be specific


@  -No


Did you speak to anyone other than the patient for history (EMS, parent, family,

police, friend...)? What history was obtained from this source 


@  -Daughter at bedside


Did you review nursing and triage notes (agree or disagree)?  Why? 


@  -I reviewed and agree with nursing and triage notes


Were old charts reviewed (outside hosp., previous admission, EMS record, old 

EKG, old radiological studies, urgent care reports/EKG's, nursing home records)?

Report findings 


@  -No old charts were reviewed


Differential Diagnosis (chest pain, altered mental status, abdominal pain women,

abdominal pain men, vaginal bleeding, weakness, fever, dyspnea, syncope, 

headache, dizziness, GI bleed, back pain, seizure, CVA, palpatations, mental 

health)? 


@  -Not applicable


EKG interpreted by me (3pts min.).


@  -As above


X-rays interpreted by me (1pt min.).


@  -None done


CT interpreted by me (1pt min.).


@  -CT brain with no masses or bleed, CT sinuses with no obvious masses 

radiologist read as possible thickening on the right


U/S interpreted by me (1pt. min.).


@  -None done


What testing was considered but not performed or refused? (CT, X-rays, U/S, labs

)? Why?


@  -None


What meds were considered but not given or refused? Why?


@  -None


Did you discuss the management of the patient with other professionals 

(professionals i.e. , PA, NP, lab, RT, psych nurse, , , 

teacher, , )? Give summary


@  -No


Was smoking cessation discussed for >3mins.?


@  -No


Was critical care preformed (if so, how long)?


@  -No


Were there social determinants of health that impacted care today? How? 

(Homelessness, low income, unemployed, alcoholism, drug addiction, 

transportation, low edu. Level, literacy, decrease access to med. care, prison, 

rehab)?


@  -No


Was there de-escalation of care discussed even if they declined (Discuss DNR or 

withdrawal of care, Hospice)? DNR status


@  -No


What co-morbidities impacted this encounter? (DM, HTN, Smoking, COPD, CAD, 

Cancer, CVA, ARF, Chemo, Hep., AIDS, mental health diagnosis, sleep apnea, 

morbid obesity)?


@  -None


Was patient admitted / discharged? Hospital course, mention meds given and 

route, prescriptions, significant lab abnormalities, going to OR and other 

pertinent info.


@  -Discharge





Patient was seen and evaluated history obtained from patient.  Patient with a 

progressively worsening headache for 1 month despite outpatient care.  CT of the

brain and sinuses was obtained.  Patient was treated with Reglan and Benadryl, 

Toradol was not given until imaging was completed.  Upon reevaluation after 

imaging patient is sleeping comfortably reports significant improvement in her 

headache.  Her blood pressure has improved with resolution of her headache.  

Discussed with patient that she does have some apparent mucosal thickening in 

her sinuses could be infectious versus inflammatory recommend continue use of 

Flonase, starting a daily antihistamine and we will do a Z-Dagoberto as she is already

taken an amoxicillin.  Was comfortable with this plan and comfortable plan for 

discharge home.


Undiagnosed new problem with uncertain prognosis?


@  -No


Drug Therapy requiring intensive monitoring for toxicity (Heparin, Nitro, 

Insulin, Cardizem)?


@  -No


Were any procedures done?


@  -No


Diagnosis/symptom?


@  -Headache, Sinusitis


Acute, or Chronic, or Acute on Chronic?


@  -Chronic


Uncomplicated (without systemic symptoms) or Complicated (systemic symptoms)?


@  -Uncomplicated


Side effects of treatment?


@  -No


Exacerbation, Progression, or Severe Exacerbation?


@  -No


Poses a threat to life or bodily function? How? (Chest pain, USA, MI, pneumonia,

PE, COPD, DKA, ARF, appy, cholecystitis, CVA, Diverticulitis, Homicidal, 

Suicidal, threat to staff... and all critical care pts)


@  -No











- Lab Data


Result diagrams: 


                                 08/05/24 03:30





                                 08/05/24 03:30


                                   Lab Results











  08/05/24 08/05/24 Range/Units





  03:30 03:30 


 


WBC  7.7   (3.8-10.6)  k/uL


 


RBC  4.85   (3.80-5.40)  m/uL


 


Hgb  14.4   (11.4-16.0)  gm/dL


 


Hct  43.6   (34.0-46.0)  %


 


MCV  90.0   (80.0-100.0)  fL


 


MCH  29.8   (25.0-35.0)  pg


 


MCHC  33.1   (31.0-37.0)  g/dL


 


RDW  13.0   (11.5-15.5)  %


 


Plt Count  325   (150-450)  k/uL


 


MPV  6.7   


 


Neutrophils %  71   %


 


Lymphocytes %  17   %


 


Monocytes %  7   %


 


Eosinophils %  3   %


 


Basophils %  1   %


 


Neutrophils #  5.5   (1.3-7.7)  k/uL


 


Lymphocytes #  1.3   (1.0-4.8)  k/uL


 


Monocytes #  0.5   (0-1.0)  k/uL


 


Eosinophils #  0.2   (0-0.7)  k/uL


 


Basophils #  0.1   (0-0.2)  k/uL


 


Sodium   137  (137-145)  mmol/L


 


Potassium   4.1  (3.5-5.1)  mmol/L


 


Chloride   109 H  ()  mmol/L


 


Carbon Dioxide   23  (22-30)  mmol/L


 


Anion Gap   5  mmol/L


 


BUN   22 H  (7-17)  mg/dL


 


Creatinine   0.72  (0.52-1.04)  mg/dL


 


Est GFR (CKD-EPI)AfAm   >90  (>60 ml/min/1.73 sqM)  


 


Est GFR (CKD-EPI)NonAf   >90  (>60 ml/min/1.73 sqM)  


 


Glucose   117 H  (74-99)  mg/dL


 


Calcium   9.4  (8.4-10.2)  mg/dL


 


Total Bilirubin   0.5  (0.2-1.3)  mg/dL


 


AST   23  (14-36)  U/L


 


ALT   21  (4-34)  U/L


 


Alkaline Phosphatase   69  ()  U/L


 


Total Protein   6.5  (6.3-8.2)  g/dL


 


Albumin   4.1  (3.5-5.0)  g/dL














Disposition


Clinical Impression: 


 Headache





Disposition: HOME SELF-CARE


Condition: Stable


Instructions (If sedation given, give patient instructions):  Acute Headache 

(ED)


Prescriptions: 


Azithromycin [Zithromax Z Pack] 1 tab PO AS DIRECTED #6 tab


Cetirizine HCl [Zyrtec] 10 mg PO DAILY 30 Days #30 tab


Is patient prescribed a controlled substance at d/c from ED?: No


Referrals: 


None,Stated [Primary Care Provider] - 1-2 days

## 2024-08-05 NOTE — CT
EXAM:

  CT Maxillofacial Sinuses With Intravenous Contrast

 

CLINICAL HISTORY:

  ITS.REASON CT Reason: pain x1mo

 

TECHNIQUE:

  Computed tomography images of the maxillofacial sinuses with 

intravenous contrast.  CTDI is 16.9 mGy and DLP is 400 mGy-cm.  This CT 

exam was performed using one or more of the following dose reduction 

techniques: automated exposure control, adjustment of the mA and/or kV 

according to patient size, and/or use of iterative reconstruction 

technique.

 

COMPARISON:

  No relevant prior studies available.

 

FINDINGS:

  Maxillary sinuses: There is mild mucosal thickening about the right 

ostiomeatal unit.  No air-fluid levels.

  Sphenoid sinuses:  Unremarkable.  No air-fluid levels.

  Frontal sinuses:  Unremarkable.  No air-fluid levels.

  Ethmoid air cells:  Unremarkable.  No air-fluid levels.

  Nasal cavity/septum: No acute findings.

  Bones/joints:  No acute fracture.  Periapical lucencies about tooth #3 

concerning for periapical abscesses.

  Soft tissues:  Unremarkable.

 

IMPRESSION:     

Mild mucosal thickening at the right ostiomeatal unit.

 

Periapical lucencies about tooth #3 concerning for periapical abscess.  

Recommend dental consult.

## 2024-08-05 NOTE — CT
EXAM:

  CT Head Without Intravenous Contrast

 

CLINICAL HISTORY:

  ITS.REASON CT Reason: Headache

 

TECHNIQUE:

  Axial computed tomography images of the head/brain without intravenous 

contrast.  CTDI is 49.1 mGy and DLP is 1109.5 mGy-cm.  This CT exam was 

performed using one or more of the following dose reduction techniques: 

automated exposure control, adjustment of the mA and/or kV according to 

patient size, and/or use of iterative reconstruction technique.

 

COMPARISON:

  No relevant prior studies available.

 

FINDINGS:

  Brain:  Unremarkable.  No hemorrhage.  No significant white matter 

disease.  No edema.

  Ventricles:  Unremarkable.  No ventriculomegaly.

  Bones/joints:  Unremarkable.  No acute fracture.

  Soft tissues:  Unremarkable.

  Sinuses:  Unremarkable as visualized.  No acute sinusitis.

  Mastoid air cells:  Unremarkable as visualized.  No mastoid effusion.

 

IMPRESSION:     

No evidence of acute intracranial pathology.

## 2024-11-16 ENCOUNTER — HOSPITAL ENCOUNTER (EMERGENCY)
Dept: HOSPITAL 47 - EC | Age: 59
Discharge: HOME | End: 2024-11-16
Payer: COMMERCIAL

## 2024-11-16 VITALS — HEART RATE: 73 BPM | SYSTOLIC BLOOD PRESSURE: 97 MMHG | DIASTOLIC BLOOD PRESSURE: 73 MMHG | RESPIRATION RATE: 17 BRPM

## 2024-11-16 VITALS — TEMPERATURE: 97.3 F

## 2024-11-16 DIAGNOSIS — F41.9: Primary | ICD-10-CM

## 2024-11-16 DIAGNOSIS — Z88.8: ICD-10-CM

## 2024-11-16 DIAGNOSIS — F17.200: ICD-10-CM

## 2024-11-16 LAB
ALBUMIN SERPL-MCNC: 4.5 G/DL (ref 3.5–5)
ALP SERPL-CCNC: 90 U/L (ref 38–126)
ALT SERPL-CCNC: 19 U/L (ref 4–34)
ANION GAP SERPL CALC-SCNC: 8 MMOL/L
APTT BLD: 24.7 SEC (ref 22–30)
AST SERPL-CCNC: 22 U/L (ref 14–36)
BASOPHILS # BLD AUTO: 0.1 K/UL (ref 0–0.2)
BASOPHILS NFR BLD AUTO: 0 %
BUN SERPL-SCNC: 28 MG/DL (ref 7–17)
CALCIUM SPEC-MCNC: 9.5 MG/DL (ref 8.4–10.2)
CHLORIDE SERPL-SCNC: 107 MMOL/L (ref 98–107)
CO2 SERPL-SCNC: 26 MMOL/L (ref 22–30)
EOSINOPHIL # BLD AUTO: 0.1 K/UL (ref 0–0.7)
EOSINOPHIL NFR BLD AUTO: 1 %
ERYTHROCYTE [DISTWIDTH] IN BLOOD BY AUTOMATED COUNT: 4.88 M/UL (ref 3.8–5.4)
ERYTHROCYTE [DISTWIDTH] IN BLOOD: 13.1 % (ref 11.5–15.5)
GLUCOSE SERPL-MCNC: 96 MG/DL (ref 74–99)
HCT VFR BLD AUTO: 44.8 % (ref 34–46)
HGB BLD-MCNC: 14.8 GM/DL (ref 11.4–16)
INR PPP: 0.8 (ref ?–1.2)
LYMPHOCYTES # SPEC AUTO: 2.2 K/UL (ref 1–4.8)
LYMPHOCYTES NFR SPEC AUTO: 18 %
MAGNESIUM SPEC-SCNC: 2 MG/DL (ref 1.6–2.3)
MCH RBC QN AUTO: 30.4 PG (ref 25–35)
MCHC RBC AUTO-ENTMCNC: 33.2 G/DL (ref 31–37)
MCV RBC AUTO: 91.8 FL (ref 80–100)
MONOCYTES # BLD AUTO: 0.8 K/UL (ref 0–1)
MONOCYTES NFR BLD AUTO: 7 %
NEUTROPHILS # BLD AUTO: 8.3 K/UL (ref 1.3–7.7)
NEUTROPHILS NFR BLD AUTO: 70 %
PH UR: 6 [PH] (ref 5–8)
PLATELET # BLD AUTO: 411 K/UL (ref 150–450)
POTASSIUM SERPL-SCNC: 3.6 MMOL/L (ref 3.5–5.1)
PROT SERPL-MCNC: 7.2 G/DL (ref 6.3–8.2)
PT BLD: 9.6 SEC (ref 10–12.5)
RBC UR QL: 2 /HPF (ref 0–5)
SODIUM SERPL-SCNC: 141 MMOL/L (ref 137–145)
SP GR UR: 1.03 (ref 1–1.03)
SQUAMOUS UR QL AUTO: 4 /HPF (ref 0–4)
UROBILINOGEN UR QL STRIP: <2 MG/DL (ref ?–2)
WBC # BLD AUTO: 11.8 K/UL (ref 3.8–10.6)
WBC #/AREA URNS HPF: 2 /HPF (ref 0–5)

## 2024-11-16 PROCEDURE — 81001 URINALYSIS AUTO W/SCOPE: CPT

## 2024-11-16 PROCEDURE — 85025 COMPLETE CBC W/AUTO DIFF WBC: CPT

## 2024-11-16 PROCEDURE — 80053 COMPREHEN METABOLIC PANEL: CPT

## 2024-11-16 PROCEDURE — 71046 X-RAY EXAM CHEST 2 VIEWS: CPT

## 2024-11-16 PROCEDURE — 93005 ELECTROCARDIOGRAM TRACING: CPT

## 2024-11-16 PROCEDURE — 85610 PROTHROMBIN TIME: CPT

## 2024-11-16 PROCEDURE — 83735 ASSAY OF MAGNESIUM: CPT

## 2024-11-16 PROCEDURE — 96374 THER/PROPH/DIAG INJ IV PUSH: CPT

## 2024-11-16 PROCEDURE — 99285 EMERGENCY DEPT VISIT HI MDM: CPT

## 2024-11-16 PROCEDURE — 85730 THROMBOPLASTIN TIME PARTIAL: CPT

## 2024-11-16 PROCEDURE — 36415 COLL VENOUS BLD VENIPUNCTURE: CPT

## 2024-11-16 PROCEDURE — 84484 ASSAY OF TROPONIN QUANT: CPT

## 2024-11-16 RX ADMIN — NITROGLYCERIN STA INCH: 20 OINTMENT TOPICAL at 07:59

## 2024-11-16 RX ADMIN — ASPIRIN 81 MG CHEWABLE TABLET STA MG: 81 TABLET CHEWABLE at 07:59

## 2025-01-09 ENCOUNTER — HOSPITAL ENCOUNTER (EMERGENCY)
Dept: HOSPITAL 47 - EC | Age: 60
Discharge: HOME | End: 2025-01-09
Payer: COMMERCIAL

## 2025-01-09 VITALS — HEART RATE: 16 BPM | TEMPERATURE: 98.3 F | DIASTOLIC BLOOD PRESSURE: 78 MMHG | SYSTOLIC BLOOD PRESSURE: 133 MMHG

## 2025-01-09 VITALS — RESPIRATION RATE: 18 BRPM

## 2025-01-09 DIAGNOSIS — Z88.5: ICD-10-CM

## 2025-01-09 DIAGNOSIS — F17.200: ICD-10-CM

## 2025-01-09 DIAGNOSIS — J32.9: Primary | ICD-10-CM

## 2025-01-09 PROCEDURE — 99284 EMERGENCY DEPT VISIT MOD MDM: CPT

## 2025-01-09 PROCEDURE — 96372 THER/PROPH/DIAG INJ SC/IM: CPT

## 2025-01-09 PROCEDURE — 87636 SARSCOV2 & INF A&B AMP PRB: CPT

## 2025-01-09 PROCEDURE — 71046 X-RAY EXAM CHEST 2 VIEWS: CPT

## 2025-01-09 RX ADMIN — KETOROLAC TROMETHAMINE STA MG: 15 INJECTION, SOLUTION INTRAMUSCULAR; INTRAVENOUS at 23:52

## 2025-01-09 RX ADMIN — IBUPROFEN STA MG: 800 TABLET, FILM COATED ORAL at 22:37

## 2025-01-09 RX ADMIN — METHYLPREDNISOLONE SODIUM SUCCINATE ONE MG: 125 INJECTION, POWDER, FOR SOLUTION INTRAMUSCULAR; INTRAVENOUS at 22:36

## 2025-01-09 NOTE — XR
EXAMINATION TYPE: XR chest 2V

 

DATE OF EXAM: 1/9/2025 10:32 PM

 

COMPARISON: Chest x-ray November 16, 2024 

 

CLINICAL INDICATION: Female, 59 years old with history of congestion, uri, 

 

TECHNIQUE:  Frontal and lateral views of the chest are obtained.

 

FINDINGS:  There is no focal air space opacity, pleural effusion, or pneumothorax seen.  The cardiac 
silhouette size is stable and upper limits of normal.   The osseous structures are intact.

 

IMPRESSION:  No acute cardiopulmonary process.

 

[facility], Workstation: [id number], 1/9/2025 10:38 PM

## 2025-02-28 ENCOUNTER — HOSPITAL ENCOUNTER (OUTPATIENT)
Dept: HOSPITAL 47 - RADXRMAIN | Age: 60
Discharge: HOME | End: 2025-02-28
Payer: COMMERCIAL

## 2025-02-28 DIAGNOSIS — R05.3: Primary | ICD-10-CM

## 2025-02-28 PROCEDURE — 71046 X-RAY EXAM CHEST 2 VIEWS: CPT

## 2025-02-28 NOTE — XR
EXAMINATION TYPE: XR chest 2V

 

DATE OF EXAM: 2/28/2025 9:51 AM

 

COMPARISON: 1/9/2025 

 

CLINICAL INDICATION: Female, 59 years old with history of R05.3 COUGH,

 

TECHNIQUE: XR chest 2V view(s) obtained.

 

FINDINGS:  

The heart size is normal.  

The pulmonary vasculature is normal.  

The lungs are clear.  

 

 

IMPRESSION:  

1. No acute pulmonary process.

 

[facility], Workstation: [id number], 2/28/2025 9:53 AM